# Patient Record
Sex: FEMALE | Race: WHITE | NOT HISPANIC OR LATINO | Employment: OTHER | URBAN - METROPOLITAN AREA
[De-identification: names, ages, dates, MRNs, and addresses within clinical notes are randomized per-mention and may not be internally consistent; named-entity substitution may affect disease eponyms.]

---

## 2023-04-14 ENCOUNTER — HOSPITAL ENCOUNTER (EMERGENCY)
Facility: CLINIC | Age: 73
Discharge: HOME | End: 2023-04-14
Attending: EMERGENCY MEDICINE | Admitting: EMERGENCY MEDICINE
Payer: COMMERCIAL

## 2023-04-14 ENCOUNTER — APPOINTMENT (OUTPATIENT)
Dept: CT IMAGING | Facility: CLINIC | Age: 73
End: 2023-04-14
Attending: EMERGENCY MEDICINE
Payer: COMMERCIAL

## 2023-04-14 ENCOUNTER — TRANSFERRED RECORDS (OUTPATIENT)
Dept: HEALTH INFORMATION MANAGEMENT | Facility: CLINIC | Age: 73
End: 2023-04-14

## 2023-04-14 VITALS
SYSTOLIC BLOOD PRESSURE: 141 MMHG | HEIGHT: 66 IN | OXYGEN SATURATION: 96 % | HEART RATE: 68 BPM | RESPIRATION RATE: 16 BRPM | TEMPERATURE: 97.7 F | DIASTOLIC BLOOD PRESSURE: 82 MMHG

## 2023-04-14 DIAGNOSIS — N30.90 BLADDER INFECTION: ICD-10-CM

## 2023-04-14 LAB
ALBUMIN SERPL BCG-MCNC: 4.1 G/DL (ref 3.5–5.2)
ALBUMIN UR-MCNC: NEGATIVE MG/DL
ALP SERPL-CCNC: 65 U/L (ref 35–104)
ALT SERPL W P-5'-P-CCNC: 24 U/L (ref 10–35)
ANION GAP SERPL CALCULATED.3IONS-SCNC: 12 MMOL/L (ref 7–15)
APPEARANCE UR: ABNORMAL
AST SERPL W P-5'-P-CCNC: 25 U/L (ref 10–35)
BASOPHILS # BLD AUTO: 0.1 10E3/UL (ref 0–0.2)
BASOPHILS NFR BLD AUTO: 1 %
BILIRUB SERPL-MCNC: 0.4 MG/DL
BILIRUB UR QL STRIP: NEGATIVE
BUN SERPL-MCNC: 20.4 MG/DL (ref 8–23)
CALCIUM SERPL-MCNC: 10.5 MG/DL (ref 8.8–10.2)
CHLORIDE SERPL-SCNC: 108 MMOL/L (ref 98–107)
COLOR UR AUTO: ABNORMAL
CREAT SERPL-MCNC: 0.85 MG/DL (ref 0.51–0.95)
CREATININE (EXTERNAL): 0.86 MG/DL (ref 0.55–1.02)
D DIMER PPP FEU-MCNC: 0.96 UG/ML FEU (ref 0–0.5)
DEPRECATED HCO3 PLAS-SCNC: 22 MMOL/L (ref 22–29)
EOSINOPHIL # BLD AUTO: 0.3 10E3/UL (ref 0–0.7)
EOSINOPHIL NFR BLD AUTO: 4 %
ERYTHROCYTE [DISTWIDTH] IN BLOOD BY AUTOMATED COUNT: 14 % (ref 10–15)
GFR ESTIMATED (EXTERNAL): >60 ML/MIN/1.73M2
GFR ESTIMATED (IF AFRICAN AMERICAN) (EXTERNAL): NORMAL ML/MIN/1.73M2
GFR SERPL CREATININE-BSD FRML MDRD: 71 ML/MIN/1.73M2
GLUCOSE (EXTERNAL): 94 MG/DL (ref 70–124)
GLUCOSE SERPL-MCNC: 96 MG/DL (ref 70–99)
GLUCOSE UR STRIP-MCNC: NEGATIVE MG/DL
HCT VFR BLD AUTO: 43.1 % (ref 35–47)
HGB BLD-MCNC: 14 G/DL (ref 11.7–15.7)
HGB UR QL STRIP: ABNORMAL
IMM GRANULOCYTES # BLD: 0 10E3/UL
IMM GRANULOCYTES NFR BLD: 0 %
KETONES UR STRIP-MCNC: NEGATIVE MG/DL
LEUKOCYTE ESTERASE UR QL STRIP: ABNORMAL
LYMPHOCYTES # BLD AUTO: 1.8 10E3/UL (ref 0.8–5.3)
LYMPHOCYTES NFR BLD AUTO: 27 %
MCH RBC QN AUTO: 30.4 PG (ref 26.5–33)
MCHC RBC AUTO-ENTMCNC: 32.5 G/DL (ref 31.5–36.5)
MCV RBC AUTO: 94 FL (ref 78–100)
MONOCYTES # BLD AUTO: 0.6 10E3/UL (ref 0–1.3)
MONOCYTES NFR BLD AUTO: 9 %
NEUTROPHILS # BLD AUTO: 4 10E3/UL (ref 1.6–8.3)
NEUTROPHILS NFR BLD AUTO: 59 %
NITRATE UR QL: NEGATIVE
NRBC # BLD AUTO: 0 10E3/UL
NRBC BLD AUTO-RTO: 0 /100
PH UR STRIP: 5 [PH] (ref 5–7)
PLATELET # BLD AUTO: 330 10E3/UL (ref 150–450)
POTASSIUM (EXTERNAL): 4.6 MMOL/L (ref 3.4–5.3)
POTASSIUM SERPL-SCNC: 4.3 MMOL/L (ref 3.4–5.3)
PROT SERPL-MCNC: 6.9 G/DL (ref 6.4–8.3)
RBC # BLD AUTO: 4.61 10E6/UL (ref 3.8–5.2)
RBC URINE: 12 /HPF
SODIUM SERPL-SCNC: 142 MMOL/L (ref 136–145)
SP GR UR STRIP: 1.02 (ref 1–1.03)
SQUAMOUS EPITHELIAL: 7 /HPF
TRANSITIONAL EPI: 4 /HPF
TROPONIN T SERPL HS-MCNC: 16 NG/L
TSH SERPL DL<=0.005 MIU/L-ACNC: 1.6 UIU/ML (ref 0.3–4.2)
TSH SERPL-ACNC: 1.44 MIU/L (ref 0.4–4.5)
UROBILINOGEN UR STRIP-MCNC: NORMAL MG/DL
WBC # BLD AUTO: 6.8 10E3/UL (ref 4–11)
WBC URINE: 160 /HPF

## 2023-04-14 PROCEDURE — 250N000011 HC RX IP 250 OP 636: Performed by: EMERGENCY MEDICINE

## 2023-04-14 PROCEDURE — 81001 URINALYSIS AUTO W/SCOPE: CPT | Performed by: EMERGENCY MEDICINE

## 2023-04-14 PROCEDURE — 71275 CT ANGIOGRAPHY CHEST: CPT

## 2023-04-14 PROCEDURE — 36415 COLL VENOUS BLD VENIPUNCTURE: CPT | Performed by: EMERGENCY MEDICINE

## 2023-04-14 PROCEDURE — 84484 ASSAY OF TROPONIN QUANT: CPT | Performed by: EMERGENCY MEDICINE

## 2023-04-14 PROCEDURE — 96361 HYDRATE IV INFUSION ADD-ON: CPT | Performed by: EMERGENCY MEDICINE

## 2023-04-14 PROCEDURE — 85025 COMPLETE CBC W/AUTO DIFF WBC: CPT | Performed by: EMERGENCY MEDICINE

## 2023-04-14 PROCEDURE — 93005 ELECTROCARDIOGRAM TRACING: CPT | Performed by: EMERGENCY MEDICINE

## 2023-04-14 PROCEDURE — 87086 URINE CULTURE/COLONY COUNT: CPT | Performed by: EMERGENCY MEDICINE

## 2023-04-14 PROCEDURE — 84443 ASSAY THYROID STIM HORMONE: CPT | Performed by: EMERGENCY MEDICINE

## 2023-04-14 PROCEDURE — 99285 EMERGENCY DEPT VISIT HI MDM: CPT | Mod: 25 | Performed by: EMERGENCY MEDICINE

## 2023-04-14 PROCEDURE — 99284 EMERGENCY DEPT VISIT MOD MDM: CPT | Mod: 59 | Performed by: EMERGENCY MEDICINE

## 2023-04-14 PROCEDURE — 80053 COMPREHEN METABOLIC PANEL: CPT | Performed by: EMERGENCY MEDICINE

## 2023-04-14 PROCEDURE — 85379 FIBRIN DEGRADATION QUANT: CPT | Performed by: EMERGENCY MEDICINE

## 2023-04-14 PROCEDURE — 250N000009 HC RX 250: Performed by: EMERGENCY MEDICINE

## 2023-04-14 PROCEDURE — 93010 ELECTROCARDIOGRAM REPORT: CPT | Performed by: EMERGENCY MEDICINE

## 2023-04-14 PROCEDURE — 96365 THER/PROPH/DIAG IV INF INIT: CPT | Mod: 59 | Performed by: EMERGENCY MEDICINE

## 2023-04-14 PROCEDURE — 71275 CT ANGIOGRAPHY CHEST: CPT | Mod: 26 | Performed by: RADIOLOGY

## 2023-04-14 PROCEDURE — 258N000003 HC RX IP 258 OP 636: Performed by: EMERGENCY MEDICINE

## 2023-04-14 RX ORDER — SULFAMETHOXAZOLE/TRIMETHOPRIM 800-160 MG
1 TABLET ORAL 2 TIMES DAILY
Qty: 10 TABLET | Refills: 0 | Status: SHIPPED | OUTPATIENT
Start: 2023-04-14 | End: 2023-04-19

## 2023-04-14 RX ORDER — CEFTRIAXONE 1 G/1
1 INJECTION, POWDER, FOR SOLUTION INTRAMUSCULAR; INTRAVENOUS ONCE
Status: COMPLETED | OUTPATIENT
Start: 2023-04-14 | End: 2023-04-14

## 2023-04-14 RX ORDER — IOPAMIDOL 755 MG/ML
70 INJECTION, SOLUTION INTRAVASCULAR ONCE
Status: COMPLETED | OUTPATIENT
Start: 2023-04-14 | End: 2023-04-14

## 2023-04-14 RX ADMIN — IOPAMIDOL 70 ML: 755 INJECTION, SOLUTION INTRAVENOUS at 14:34

## 2023-04-14 RX ADMIN — CEFTRIAXONE SODIUM 1 G: 1 INJECTION, POWDER, FOR SOLUTION INTRAMUSCULAR; INTRAVENOUS at 14:28

## 2023-04-14 RX ADMIN — SODIUM CHLORIDE, PRESERVATIVE FREE 89 ML: 5 INJECTION INTRAVENOUS at 14:34

## 2023-04-14 RX ADMIN — SODIUM CHLORIDE 1000 ML: 9 INJECTION, SOLUTION INTRAVENOUS at 14:53

## 2023-04-14 ASSESSMENT — ACTIVITIES OF DAILY LIVING (ADL)
ADLS_ACUITY_SCORE: 35
ADLS_ACUITY_SCORE: 35

## 2023-04-14 NOTE — ED TRIAGE NOTES
Fatigue for past 10 days. Seen at clinic today for fatigue, irregular heart rate, intermittent chest pain and intermittent numbness/tingling in L hand. Instructed to come to ED due to elevated D Dimer. Intermittent dizziness.     Triage Assessment     Row Name 04/14/23 1237       Triage Assessment (Adult)    Airway WDL WDL       Respiratory WDL    Respiratory WDL WDL       Skin Circulation/Temperature WDL    Skin Circulation/Temperature WDL WDL       Cardiac WDL    Cardiac WDL WDL       Peripheral/Neurovascular WDL    Peripheral Neurovascular WDL WDL       Cognitive/Neuro/Behavioral WDL    Cognitive/Neuro/Behavioral WDL WDL

## 2023-04-14 NOTE — ED PROVIDER NOTES
"    [place] EMERGENCY DEPARTMENT ([facility])    4/14/23      History   No chief complaint on file.    HPI  Casi Stockton is a 72 year old female with PMH significant for Hashimoto's thyroiditis who presents to the ED for evaluation of intermittent episodes of palpitations, chest pain, dizziness/lightheadedness, left hand tingling, and fatigue.  Patient visited an urgent care earlier today where she had an elevated D-dimer who referred her to the ED.    Patient reports she began experiencing the symptoms 10 days ago, thought the palpitations were originally related to her coffee intake, but has since stopped with the symptoms persisting.  She also reports significant fatigue over the past 10 days, more than she had previously had with Hashimoto's.  She states that the left-sided chest pain, left hand tingling, and dizziness/lightheadedness started at the same time of the palpitations 10 days ago.  She states this onsets intermittently and after some rest will normally improve.  Patient indicates she came from [place] 4 months ago.  She denies fever or chills.  She denies history of PE/DVT.  She denies a history of kidney disease.  She denies shortness of breath.  Patient denies any history of smoking.    Past Medical History  Past Medical History:   Diagnosis Date     Hashimoto's thyroiditis      Past Surgical History:   Procedure Laterality Date     CHOLECYSTECTOMY      25 years ago     No current outpatient medications on file.    No Known Allergies  Family History  No family history on file.  Social History   Social History     Tobacco Use     Smoking status: Never     Smokeless tobacco: Never   Substance Use Topics     Alcohol use: Yes     Alcohol/week: 7.0 standard drinks of alcohol     Types: 7 Glasses of wine per week     Comment: \"Very small\" portion with dinner daily     Drug use: Never         A medically appropriate review of systems was performed with pertinent positives and negatives noted in " "the HPI, and all other systems negative.    Physical Exam   BP: (!) 141/82  Pulse: 68  Temp: 97.7  F (36.5  C)  Resp: 16  Height: 168 cm (5' 6.14\")  SpO2: 96 %  Physical Exam  Vitals and nursing note reviewed.   Constitutional:       General: She is not in acute distress.     Appearance: Normal appearance. She is well-developed.   HENT:      Head: Normocephalic and atraumatic.   Eyes:      General: No scleral icterus.     Conjunctiva/sclera: Conjunctivae normal.   Cardiovascular:      Rate and Rhythm: Normal rate.   Pulmonary:      Effort: Pulmonary effort is normal. No respiratory distress.   Abdominal:      General: Abdomen is flat.   Musculoskeletal:      Cervical back: Normal range of motion and neck supple.   Skin:     General: Skin is warm and dry.      Findings: No rash.   Neurological:      General: No focal deficit present.      Mental Status: She is alert and oriented to person, place, and time.   Psychiatric:         Mood and Affect: Mood normal.         Behavior: Behavior normal.             ED Course, Procedures, & Data      Procedures   12:52 PM  The patient was seen and examined by Willie Arroyo MD in Room ED[facility].          ED Course Selections:        EKG Interpretation:      Interpreted by Willie Arroyo MD  Time reviewed: per Epic  Symptoms at time of EKG: none   Rhythm: normal sinus   Rate: normal  Axis: normal  Ectopy: none  Conduction: normal  ST Segments/ T Waves: No ST-T wave changes  Q Waves: none  Comparison to prior: No old EKG available    Clinical Impression: normal EKG                     Results for orders placed or performed during the hospital encounter of 04/14/23   CBC with platelets differential     Status: None ()    Narrative    The following orders were created for panel order CBC with platelets differential.  Procedure                               Abnormality         Status                     ---------                               -----------         ------                   "   CBC with platelets and d...[[id number]]                                                   Please view results for these tests on the individual orders.     Medications - No data to display  Labs Ordered and Resulted from Time of ED Arrival to Time of ED Departure - No data to display  CT Chest Pulmonary Embolism w Contrast    (Results Pending)          Critical care was not performed.     Medical Decision Making  The patient's presentation was of moderate complexity (an undiagnosed new problem with uncertain diagnosis).    The patient's evaluation involved:  ordering and/or review of 3+ test(s) in this encounter (see separate area of note for details)    The patient's management necessitated moderate risk (prescription drug management including medications given in the ED).      Assessment & Plan      72 year old female with PMH significant for Hashimoto's thyroiditis who presents to the ED for evaluation of intermittent episodes of palpitations, chest pain, dizziness/lightheadedness, left hand tingling, and fatigue.  Patient visited an urgent care earlier today where she had an elevated D-dimer who referred her to the ED. vital signs stable and afebrile in triage including normal pulse ox at 96% on room air.  EKG obtained which revealed nonspecific ST-T wave changes.  IV established, labs ransacked reviewed document epic markable for dimer elevation 0.96, troponin mildly elevated at 16 but otherwise normal CBC electrolytes.  Patient had a UA which showed 160 whites and positive leukoesterase.  She had a CT of the chest which revealed no evidence of PE.  She is given a liter normal saline fluid bolus along with ceftriaxone 1 g IV.  Patient discharged home with prescription for antibiotics and expressed verbal understanding of anticipatory guidance return precautions to the emergency room.    I have reviewed the nursing notes. I have reviewed the findings, diagnosis, plan and need for follow up with the  patient.    New Prescriptions    No medications on file       Final diagnoses:   Bladder infection     Edenilson MARQUEZ, am serving as a trained medical scribe to document services personally performed by Willie Arroyo MD, based on the provider's statements to me.     Willie MARQUEZ MD, was physically present and have reviewed and verified the accuracy of this note documented by Edenilson Major.    Willie Arroyo MD  [facility] EMERGENCY DEPARTMENT  4/14/2023     Willie Arroyo MD  05/13/23 9710

## 2023-04-15 LAB
ATRIAL RATE - MUSE: 63 BPM
DIASTOLIC BLOOD PRESSURE - MUSE: NORMAL MMHG
INTERPRETATION ECG - MUSE: NORMAL
P AXIS - MUSE: 55 DEGREES
PR INTERVAL - MUSE: 204 MS
QRS DURATION - MUSE: 94 MS
QT - MUSE: 428 MS
QTC - MUSE: 437 MS
R AXIS - MUSE: 9 DEGREES
SYSTOLIC BLOOD PRESSURE - MUSE: NORMAL MMHG
T AXIS - MUSE: 27 DEGREES
VENTRICULAR RATE- MUSE: 63 BPM

## 2023-04-16 LAB — BACTERIA UR CULT: NORMAL

## 2023-04-26 ENCOUNTER — TRANSFERRED RECORDS (OUTPATIENT)
Dept: HEALTH INFORMATION MANAGEMENT | Facility: CLINIC | Age: 73
End: 2023-04-26
Payer: COMMERCIAL

## 2023-04-26 ENCOUNTER — MEDICAL CORRESPONDENCE (OUTPATIENT)
Dept: HEALTH INFORMATION MANAGEMENT | Facility: CLINIC | Age: 73
End: 2023-04-26
Payer: COMMERCIAL

## 2023-04-27 ENCOUNTER — TRANSCRIBE ORDERS (OUTPATIENT)
Dept: OTHER | Age: 73
End: 2023-04-27

## 2023-04-27 DIAGNOSIS — R53.83 FATIGUE: ICD-10-CM

## 2023-04-27 DIAGNOSIS — R00.2 PALPITATIONS: Primary | ICD-10-CM

## 2023-05-01 ENCOUNTER — ANCILLARY PROCEDURE (OUTPATIENT)
Dept: CARDIOLOGY | Facility: CLINIC | Age: 73
End: 2023-05-01
Attending: PHYSICIAN ASSISTANT
Payer: COMMERCIAL

## 2023-05-01 DIAGNOSIS — R00.2 PALPITATIONS: ICD-10-CM

## 2023-05-01 DIAGNOSIS — R53.83 FATIGUE: ICD-10-CM

## 2023-05-01 PROCEDURE — 93246 EXT ECG>7D<15D RECORDING: CPT

## 2023-05-01 PROCEDURE — 93248 EXT ECG>7D<15D REV&INTERPJ: CPT | Performed by: INTERNAL MEDICINE

## 2023-05-01 NOTE — PROGRESS NOTES
Per Dr. Tapia, patient to have 14-day zio monitor placed.  Diagnosis: Palpitations and fatigue  Monitor placed: Yes  Patient Instructed: Yes  Patient verbalized understanding: Yes  Holter # [id number]      Monitor was placed by ASHIA Saba   8:51 AM

## 2023-05-21 ENCOUNTER — HEALTH MAINTENANCE LETTER (OUTPATIENT)
Age: 73
End: 2023-05-21

## 2023-06-05 ENCOUNTER — OFFICE VISIT (OUTPATIENT)
Dept: CARDIOLOGY | Facility: CLINIC | Age: 73
End: 2023-06-05
Attending: INTERNAL MEDICINE
Payer: COMMERCIAL

## 2023-06-05 VITALS
HEART RATE: 70 BPM | HEIGHT: 66 IN | DIASTOLIC BLOOD PRESSURE: 73 MMHG | SYSTOLIC BLOOD PRESSURE: 108 MMHG | BODY MASS INDEX: 31.45 KG/M2 | WEIGHT: 195.7 LBS | OXYGEN SATURATION: 95 %

## 2023-06-05 DIAGNOSIS — R53.82 CHRONIC FATIGUE: ICD-10-CM

## 2023-06-05 DIAGNOSIS — I48.0 PAROXYSMAL ATRIAL FIBRILLATION (H): Primary | ICD-10-CM

## 2023-06-05 DIAGNOSIS — R00.2 PALPITATIONS: ICD-10-CM

## 2023-06-05 PROCEDURE — 93010 ELECTROCARDIOGRAM REPORT: CPT | Mod: 59 | Performed by: INTERNAL MEDICINE

## 2023-06-05 PROCEDURE — 99213 OFFICE O/P EST LOW 20 MIN: CPT | Mod: 25 | Performed by: INTERNAL MEDICINE

## 2023-06-05 PROCEDURE — 99215 OFFICE O/P EST HI 40 MIN: CPT | Performed by: INTERNAL MEDICINE

## 2023-06-05 PROCEDURE — 93005 ELECTROCARDIOGRAM TRACING: CPT

## 2023-06-05 RX ORDER — ASPIRIN 81 MG/1
81 TABLET ORAL DAILY
COMMUNITY
Start: 2023-06-05

## 2023-06-05 ASSESSMENT — PAIN SCALES - GENERAL: PAINLEVEL: NO PAIN (0)

## 2023-06-05 NOTE — NURSING NOTE
Chief Complaint   Patient presents with     New Patient     New EP- NEW EP referral d/t palpitations     Vitals were taken, medications reconciled, and EKG was performed.    ASHIA Valerio  9:05 AM

## 2023-06-05 NOTE — PROGRESS NOTES
"HPI:   Casi Stockton is a 72 year old female with PMH significant for Hashimoto's thyroiditis -> euthyroid and PAF.  She was referred for a cardiology evaluation.    She presented to the ED for evaluation of intermittent episodes of palpitations.  The cardiology work ups were WNL but Zio patch revealed PAF.    She started feeling palpitation in 4/2023.  But her chief complaint is daily fatigue.  She was found to have UTI at the ED visit.  She feels better after UTI has resolved.  She denied any chest pain and SOB and complained of occasional dizziness and palpitation.    PAST MEDICAL HISTORY:  Past Medical History:   Diagnosis Date     Hashimoto's thyroiditis        CURRENT MEDICATIONS:  No current outpatient medications on file.       PAST SURGICAL HISTORY:  Past Surgical History:   Procedure Laterality Date     CHOLECYSTECTOMY      25 years ago       ALLERGIES:   No Known Allergies    FAMILY HISTORY:  - Premature coronary artery disease  - Atrial fibrillation  - Sudden cardiac death     SOCIAL HISTORY:  Social History     Tobacco Use     Smoking status: Never     Smokeless tobacco: Never   Substance Use Topics     Alcohol use: Yes     Alcohol/week: 7.0 standard drinks of alcohol     Types: 7 Glasses of wine per week     Comment: \"Very small\" portion with dinner daily     Drug use: Never       ROS:   Constitutional: No fever, chills, or sweats. Weight stable.   ENT: No visual disturbance, ear ache, epistaxis, sore throat.   Cardiovascular: As per HPI.   Respiratory: No cough, hemoptysis.    GI: No nausea, vomiting, hematemesis, melena, or hematochezia.   : No hematuria.   Integument: Negative.   Psychiatric: Negative.   Hematologic:  Easy bruising, no easy bleeding.  Neuro: Negative.   Endocrinology: No significant heat or cold intolerance   Musculoskeletal: No myalgia.    Exam:  /73 (BP Location: Right arm, Patient Position: Chair, Cuff Size: Adult Regular)   Pulse 70   Ht 1.676 m (5' 5.98\")   Wt 88.8 " kg (195 lb 11.2 oz)   SpO2 95%   BMI 31.60 kg/m    GENERAL APPEARANCE: healthy, alert and no distress  HEENT: no icterus, no xanthelasmas, normal pupil size and reaction, normal palate, mucosa moist, no central cyanosis  NECK: no adenopathy, no asymmetry, masses, or scars, thyroid normal to palpation and no bruits, JVP not elevated  RESPIRATORY: lungs clear to auscultation - no rales, rhonchi or wheezes, no use of accessory muscles, no retractions, respirations are unlabored, normal respiratory rate  CARDIOVASCULAR: regular rhythm, normal S1 with physiologic split S2, no S3 or S4 and no murmur, click or rub, precordium quiet with normal PMI.  ABDOMEN: soft, non tender, without hepatosplenomegaly, no masses palpable, bowel sounds normal, aorta not enlarged by palpation, no abdominal bruits  EXTREMITIES: peripheral pulses normal, no edema, no bruits  NEURO: alert and oriented to person/place/time, normal speech, gait and affect  VASC: Radial, femoral, dorsalis pedis and posterior tibialis pulses are normal in volumes and symmetric bilaterally. No bruits are heard.  SKIN: no ecchymoses, no rashes    Labs:  CBC RESULTS:   Lab Results   Component Value Date    WBC 6.8 04/14/2023    RBC 4.61 04/14/2023    HGB 14.0 04/14/2023    HCT 43.1 04/14/2023    MCV 94 04/14/2023    MCH 30.4 04/14/2023    MCHC 32.5 04/14/2023    RDW 14.0 04/14/2023     04/14/2023       BMP RESULTS:  Lab Results   Component Value Date     04/14/2023    POTASSIUM 4.3 04/14/2023    CHLORIDE 108 (H) 04/14/2023    CO2 22 04/14/2023    ANIONGAP 12 04/14/2023    GLC 96 04/14/2023    BUN 20.4 04/14/2023    CR 0.85 04/14/2023    GFRESTIMATED 71 04/14/2023    CARLOS 10.5 (H) 04/14/2023        INR RESULTS:  No results found for: INR    Procedures:  ECG on 4/14/2023: Reviewed.      Zio patch in 5/2023: Reviewed.            ECG on 6/5/2023: Reviewed.        Assessment and Plan:   # Hashimoto's thyroiditis -> euthyroid  # Daily fatigue  She felt tired  every day when she wore Zio patch but she had PAF on only 2 days for those 2 weeks.  Therefore, her daily fatigue is being caused by something other than PAF.  # PAF  Less symptomatic?  Because her baseline BP is low, she could not tolerate BB or Ca channel blockers.  I advised her to take ASA 81 mg daily and wear a wearable monitor like Apple Watch to monitor her AF burden.  I will see her back with a repeat Zio patch in 6 months.     I spent a total of 45 min today to review the records, see the patient, and complete the documents.    CC  Patient Care Team:  No Ref-Primary, Physician as PCP - SADE Juarez

## 2023-06-05 NOTE — LETTER
"6/5/2023      RE: Casi Stockton  [address]  [facility] [address]       Dear Colleague,    Thank you for the opportunity to participate in the care of your patient, Casi Stockton, at the [facility] [place] [facility]. Please see a copy of my visit note below.    HPI:   Casi Stockton is a 72 year old female with PMH significant for Hashimoto's thyroiditis -> euthyroid and PAF.  She was referred for a cardiology evaluation.    She presented to the ED for evaluation of intermittent episodes of palpitations.  The cardiology work ups were WNL but Zio patch revealed PAF.    She started feeling palpitation in 4/2023.  But her chief complaint is daily fatigue.  She was found to have UTI at the ED visit.  She feels better after UTI has resolved.  She denied any chest pain and SOB and complained of occasional dizziness and palpitation.    PAST MEDICAL HISTORY:  Past Medical History:   Diagnosis Date    Hashimoto's thyroiditis        CURRENT MEDICATIONS:  No current outpatient medications on file.       PAST SURGICAL HISTORY:  Past Surgical History:   Procedure Laterality Date    CHOLECYSTECTOMY      25 years ago       ALLERGIES:   No Known Allergies    FAMILY HISTORY:  - Premature coronary artery disease  - Atrial fibrillation  - Sudden cardiac death     SOCIAL HISTORY:  Social History     Tobacco Use    Smoking status: Never    Smokeless tobacco: Never   Substance Use Topics    Alcohol use: Yes     Alcohol/week: 7.0 standard drinks of alcohol     Types: 7 Glasses of wine per week     Comment: \"Very small\" portion with dinner daily    Drug use: Never       ROS:   Constitutional: No fever, chills, or sweats. Weight stable.   ENT: No visual disturbance, ear ache, epistaxis, sore throat.   Cardiovascular: As per HPI.   Respiratory: No cough, hemoptysis.    GI: No nausea, vomiting, hematemesis, melena, or hematochezia.   : No hematuria.   Integument: " "Negative.   Psychiatric: Negative.   Hematologic:  Easy bruising, no easy bleeding.  Neuro: Negative.   Endocrinology: No significant heat or cold intolerance   Musculoskeletal: No myalgia.    Exam:  /73 (BP Location: Right arm, Patient Position: Chair, Cuff Size: Adult Regular)   Pulse 70   Ht 1.676 m (5' 5.98\")   Wt 88.8 kg (195 lb 11.2 oz)   SpO2 95%   BMI 31.60 kg/m    GENERAL APPEARANCE: healthy, alert and no distress  HEENT: no icterus, no xanthelasmas, normal pupil size and reaction, normal palate, mucosa moist, no central cyanosis  NECK: no adenopathy, no asymmetry, masses, or scars, thyroid normal to palpation and no bruits, JVP not elevated  RESPIRATORY: lungs clear to auscultation - no rales, rhonchi or wheezes, no use of accessory muscles, no retractions, respirations are unlabored, normal respiratory rate  CARDIOVASCULAR: regular rhythm, normal S1 with physiologic split S2, no S3 or S4 and no murmur, click or rub, precordium quiet with normal PMI.  ABDOMEN: soft, non tender, without hepatosplenomegaly, no masses palpable, bowel sounds normal, aorta not enlarged by palpation, no abdominal bruits  EXTREMITIES: peripheral pulses normal, no edema, no bruits  NEURO: alert and oriented to person/place/time, normal speech, gait and affect  VASC: Radial, femoral, dorsalis pedis and posterior tibialis pulses are normal in volumes and symmetric bilaterally. No bruits are heard.  SKIN: no ecchymoses, no rashes    Labs:  CBC RESULTS:   Lab Results   Component Value Date    WBC 6.8 04/14/2023    RBC 4.61 04/14/2023    HGB 14.0 04/14/2023    HCT 43.1 04/14/2023    MCV 94 04/14/2023    MCH 30.4 04/14/2023    MCHC 32.5 04/14/2023    RDW 14.0 04/14/2023     04/14/2023       BMP RESULTS:  Lab Results   Component Value Date     04/14/2023    POTASSIUM 4.3 04/14/2023    CHLORIDE 108 (H) 04/14/2023    CO2 22 04/14/2023    ANIONGAP 12 04/14/2023    GLC 96 04/14/2023    BUN 20.4 04/14/2023    CR 0.85 " 04/14/2023    GFRESTIMATED 71 04/14/2023    CARLOS 10.5 (H) 04/14/2023        INR RESULTS:  No results found for: INR    Procedures:  ECG on 4/14/2023: Reviewed.      Zio patch in 5/2023: Reviewed.            ECG on 6/5/2023: Reviewed.        Assessment and Plan:   # Hashimoto's thyroiditis -> euthyroid  # Daily fatigue  She felt tired every day when she wore Zio patch but she had PAF on only 2 days for those 2 weeks.  Therefore, her daily fatigue is being caused by something other than PAF.  # PAF  Less symptomatic?  Because her baseline BP is low, she could not tolerate BB or Ca channel blockers.  I advised her to take ASA 81 mg daily and wear a wearable monitor like Apple Watch to monitor her AF burden.  I will see her back with a repeat Zio patch in 6 months.     I spent a total of 45 min today to review the records, see the patient, and complete the documents.    CC  Patient Care Team:  No Ref-Primary, Physician as PCP - SADE Juarez        Please do not hesitate to contact me if you have any questions/concerns.     Sincerely,     Denise Renee MD

## 2023-06-05 NOTE — PATIENT INSTRUCTIONS
You were seen in the Electrophysiology Clinic today by: Dr. Renee    Plan:   Medication Changes: Start Aspirin 81 mg daily     Labs/Tests Needed: Repeat 14 day ziopatch heart monitor prior to follow up with Dr. Renee in January 2024> Please let us know when you are back in [place] and we will send this out to you.     Follow up Visit: with Dr. Renee in about 6 months.     If you have further questions, please utilize [employer] to contact us.     Your Care Team:    EP Cardiology   Telephone Number     Nurse Line  Riley Scott RN    [phone]     For scheduling appointments:   [facility]   [phone]   For procedure scheduling:    Doretha Marroquin     [phone]   For the Device Clinic (Pacemakers, ICDs, Loop Recorders)    During business hours: [phone]  After business hours:   [phone]- select option 4 and ask for job code [id number].       On-call cardiologist for after hours or on weekends:   [phone], option #4, and ask to speak to the on-call cardiologist.     Cardiovascular Clinic:   [address] [place] [address]. [place] [address]      As always, Thank you for trusting us with your health care needs!

## 2023-06-06 LAB
ATRIAL RATE - MUSE: 64 BPM
DIASTOLIC BLOOD PRESSURE - MUSE: NORMAL MMHG
INTERPRETATION ECG - MUSE: NORMAL
P AXIS - MUSE: -16 DEGREES
PR INTERVAL - MUSE: 188 MS
QRS DURATION - MUSE: 88 MS
QT - MUSE: 410 MS
QTC - MUSE: 422 MS
R AXIS - MUSE: -15 DEGREES
SYSTOLIC BLOOD PRESSURE - MUSE: NORMAL MMHG
T AXIS - MUSE: 5 DEGREES
VENTRICULAR RATE- MUSE: 64 BPM

## 2024-07-28 ENCOUNTER — HEALTH MAINTENANCE LETTER (OUTPATIENT)
Age: 74
End: 2024-07-28

## 2025-08-10 ENCOUNTER — HEALTH MAINTENANCE LETTER (OUTPATIENT)
Age: 75
End: 2025-08-10